# Patient Record
(demographics unavailable — no encounter records)

---

## 2024-10-07 NOTE — DATA REVIEWED
[FreeTextEntry1] : Radiographs 3 views of the right elbow reviewed showing no fracture dislocation no destructive lesions

## 2024-10-07 NOTE — ASSESSMENT
[FreeTextEntry1] : Patient has medial elbow fibrosis.  The ends proximal and distal to the elbow itself.  No associated neurologic injury.  Range of motion to the elbow.  This fibrosis which seems to be related to what sounds like a infiltration of an IV that was getting chemotherapy he then said her vial was injected and not sure what that vial was.  I have advised him to find out what the chemotherapy agent was with him again to discuss the situation with his oncologist as they would have more long-term experience with this situation.  Get an MRI of his right elbow to evaluate for soft tissue damage

## 2024-10-07 NOTE — HISTORY OF PRESENT ILLNESS
[de-identified] : 68-year-old male is being treated for colon cancer on chemotherapy had what sounds like an infiltration of the IV in his elbow as well as possibly in the forearm Kurd in April he comes in for the evaluation today he has discussed this he said with his oncologist they felt it would resolve and he comes in for the evaluation today complaining about stiffness in the elbow and thickness around the elbow

## 2024-10-07 NOTE — PHYSICAL EXAM
[de-identified] : Patient on the medial forearm has a skin discoloration.  The skin feels fibrotic as well.  Deep also feels fibrotic.  Normal capillary refill in the fingers.  Good range of motion to the fingers.  No erythema ecchymoses or abrasions.  But there is discoloration.

## 2024-11-04 NOTE — HISTORY OF PRESENT ILLNESS
[de-identified] : 68-year-old male had an IV with chemotherapy infiltrate on the right elbow happened in April still has pain discomfort in the elbow she did do therapy

## 2024-11-04 NOTE — DATA REVIEWED
[FreeTextEntry1] : MRI demonstrates the abnormality to the medial aspect of the elbow consistent with fibrotic tissue

## 2024-11-04 NOTE — PHYSICAL EXAM
[de-identified] : Patient has fibrotic tissue in her anterior lateral aspect of the right elbow has good range of motion to the elbow fibrotic tissue extends proximal and distal to the elbow lateral side is normal tissue

## 2024-11-04 NOTE — ASSESSMENT
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  He has good range of motion to the elbow but has this fibrotic tissue.    I do not know of a surgical treatment for the condition.  He will see me back on an as-needed basis.  This is permanent.